# Patient Record
Sex: MALE | Race: ASIAN | NOT HISPANIC OR LATINO
[De-identification: names, ages, dates, MRNs, and addresses within clinical notes are randomized per-mention and may not be internally consistent; named-entity substitution may affect disease eponyms.]

---

## 2017-02-21 ENCOUNTER — TRANSCRIPTION ENCOUNTER (OUTPATIENT)
Age: 42
End: 2017-02-21

## 2017-03-12 ENCOUNTER — TRANSCRIPTION ENCOUNTER (OUTPATIENT)
Age: 42
End: 2017-03-12

## 2018-01-11 ENCOUNTER — TRANSCRIPTION ENCOUNTER (OUTPATIENT)
Age: 43
End: 2018-01-11

## 2018-06-25 ENCOUNTER — TRANSCRIPTION ENCOUNTER (OUTPATIENT)
Age: 43
End: 2018-06-25

## 2018-07-09 ENCOUNTER — EMERGENCY (EMERGENCY)
Facility: HOSPITAL | Age: 43
LOS: 1 days | Discharge: HOME | End: 2018-07-09
Attending: EMERGENCY MEDICINE | Admitting: EMERGENCY MEDICINE
Payer: COMMERCIAL

## 2018-07-09 VITALS
TEMPERATURE: 98 F | RESPIRATION RATE: 16 BRPM | DIASTOLIC BLOOD PRESSURE: 76 MMHG | OXYGEN SATURATION: 100 % | HEIGHT: 72 IN | WEIGHT: 229.94 LBS | HEART RATE: 70 BPM | SYSTOLIC BLOOD PRESSURE: 130 MMHG

## 2018-07-09 PROCEDURE — 99282 EMERGENCY DEPT VISIT SF MDM: CPT

## 2018-07-09 PROCEDURE — 99283 EMERGENCY DEPT VISIT LOW MDM: CPT

## 2018-07-09 RX ORDER — METFORMIN HYDROCHLORIDE 850 MG/1
1 TABLET ORAL
Qty: 0 | Refills: 0 | COMMUNITY

## 2018-07-09 RX ORDER — METHYLPHENIDATE HCL 5 MG
1 TABLET ORAL
Qty: 0 | Refills: 0 | COMMUNITY

## 2018-07-09 NOTE — ED PROVIDER NOTE - MEDICAL DECISION MAKING DETAILS
cut left 3rd digit with razor cutting tennis ball, washed out immediately at home, superficial and linear to left 3rd fingertip, provided wound care and dermabond closure, tolerated well, f/u 2d wound check

## 2018-07-09 NOTE — ED PROVIDER NOTE - OBJECTIVE STATEMENT
44 y/o M pt with hx of DM presents to the ED c/o laceration and bleeding to the left 3rd finger s/p accidently cutting it with a razor blade approximately 1 hour ago. Pt states that he was trying to cut a tennis ball when his hand slipped and cut his left 3rd finger. Tetanus is UTD (last one March 2018). Denies numbness or tingling in the finger, weakness of the finger, foreign body other than the razor blade. No other complaints at this time.

## 2020-02-03 ENCOUNTER — TRANSCRIPTION ENCOUNTER (OUTPATIENT)
Age: 45
End: 2020-02-03

## 2020-08-10 ENCOUNTER — TRANSCRIPTION ENCOUNTER (OUTPATIENT)
Age: 45
End: 2020-08-10

## 2021-03-11 NOTE — ED ADULT NURSE NOTE - CCCP TRG CHIEF CMPLNT
Date of Service: 03/10/2021    The patient was last seen 05/2020 for ongoing treatment of sleep apnea syndrome.  The patient is now working first shift at [employer] and he is pleased with the replacement ResMed S10 CPAP unit provided through [employer].  Most recent data download of 03/08/2021 shows average usage of 7 hours per night, a median delivered pressure of 11.2 and a 95th percentile delivered pressure of 15.4 cm with a set range of 10-20 cm.  There was no significant mask leakage or residual AHI.    We reviewed a variety of issues related to CPAP cleaning machines and sleep hygiene as well as CPAP maintenance and humidification.  The patient will be reevaluated in 1 year, sooner if needed.      Dictated By: Theodore Greenberg MD  Signing Provider: MD BELKYS Massey/maricarmen ([id number])  DD: 03/10/2021 15:03:36 TD: 03/11/2021 08:15:20    Copy Sent To:    lacerations

## 2022-05-02 ENCOUNTER — EMERGENCY (EMERGENCY)
Facility: HOSPITAL | Age: 47
LOS: 1 days | Discharge: HOME | End: 2022-05-02
Attending: EMERGENCY MEDICINE | Admitting: EMERGENCY MEDICINE
Payer: COMMERCIAL

## 2022-05-02 VITALS
TEMPERATURE: 98 F | RESPIRATION RATE: 18 BRPM | HEART RATE: 100 BPM | OXYGEN SATURATION: 98 % | DIASTOLIC BLOOD PRESSURE: 80 MMHG | SYSTOLIC BLOOD PRESSURE: 115 MMHG

## 2022-05-02 VITALS
WEIGHT: 229.94 LBS | SYSTOLIC BLOOD PRESSURE: 113 MMHG | HEART RATE: 100 BPM | OXYGEN SATURATION: 96 % | DIASTOLIC BLOOD PRESSURE: 79 MMHG | TEMPERATURE: 98 F | HEIGHT: 71 IN | RESPIRATION RATE: 18 BRPM

## 2022-05-02 PROCEDURE — 73030 X-RAY EXAM OF SHOULDER: CPT | Mod: 26,LT

## 2022-05-02 PROCEDURE — 73030 X-RAY EXAM OF SHOULDER: CPT

## 2022-05-02 PROCEDURE — 99283 EMERGENCY DEPT VISIT LOW MDM: CPT | Mod: 25

## 2022-05-02 PROCEDURE — 99284 EMERGENCY DEPT VISIT MOD MDM: CPT

## 2022-05-02 NOTE — ED PROVIDER NOTE - PATIENT PORTAL LINK FT
You can access the FollowMyHealth Patient Portal offered by [facility] by registering at the following website: [email or web address]. By joining [employer]’s FollowMyHealth portal, you will also be able to view your health information using other applications (apps) compatible with our system.

## 2022-05-02 NOTE — ED PROVIDER NOTE - NSFOLLOWUPINSTRUCTIONS_ED_ALL_ED_FT
Shoulder Sprain       A shoulder sprain is a partial or complete tear in one of the tough, fiber-like tissues (ligaments) in the shoulder. The ligaments in the shoulder help to hold the shoulder in place.      What are the causes?    This condition may be caused by:  •A fall.      •A hit to the shoulder.      •A twist of the arm.        What increases the risk?    You are more likely to develop this condition if you:  •Play sports.      •Have problems with balance or coordination.        What are the signs or symptoms?    Symptoms of this condition include:  •Pain when moving the shoulder.      •Limited ability to move the shoulder.      •Swelling and tenderness on top of the shoulder.      •Warmth in the shoulder.      •A change in the shape of the shoulder.      •Redness or bruising on the shoulder.        How is this diagnosed?    This condition is diagnosed with:  •A physical exam. During the exam, you may be asked to do simple exercises with your shoulder.      •Imaging tests such as X-rays, MRI, or a CT scan. These tests can show how severe the sprain is.        How is this treated?    This condition may be treated with:  •Rest.      •Pain medicine.      •Ice.      •A sling or brace. This is used to keep the arm still while the shoulder is healing.      •Physical therapy or rehabilitation exercises. These help to improve the range of motion and strength of the shoulder.      •Surgery (rare). Surgery may be needed if the sprain caused a joint to become unstable. Surgery may also be needed to reduce pain.      Some people may develop ongoing shoulder pain or lose some range of motion in the shoulder. However, most people do not develop long-term problems.      Follow these instructions at home:     If you have a sling or brace:     •Wear the sling or brace as told by your health care provider. Remove it only as told by your health care provider.      •Loosen the sling or brace if your fingers tingle, become numb, or turn cold and blue.      •Keep the sling or brace clean.    •If the sling or brace is not waterproof:  •Do not let it get wet.      •Cover it with a watertight covering when you take a bath or shower.        Activity     •Rest your shoulder.      •Move your arm only as much as told by your health care provider, but move your hand and fingers often to prevent stiffness and swelling.      •Return to your normal activities as told by your health care provider. Ask your health care provider what activities are safe for you.      •Ask your health care provider when it is safe for you to drive if you have a sling or brace on your shoulder.      •If you were shown how to do any exercises, do them as told by your health care provider.      General instructions   •If directed, put ice on the affected area.  •Put ice in a plastic bag.      •Place a towel between your skin and the bag.      •Leave the ice on for 20 minutes, 2–3 times a day.        •Take over-the-counter and prescription medicines only as told by your health care provider.      • Do not use any products that contain nicotine or tobacco, such as cigarettes, e-cigarettes, and chewing tobacco. These can delay healing. If you need help quitting, ask your health care provider.      •Keep all follow-up visits as told by your health care provider. This is important.        Contact a health care provider if:    •Your pain gets worse.      •Your pain is not relieved with medicines.      •You have increased redness or swelling.        Get help right away if:    •You have a fever.      •You cannot move your arm or shoulder.      •You develop severe numbness or tingling in your arm, hand, or fingers.      •Your arm, hand, or fingers feel cold and turn blue, white, or gray.        Summary    •A shoulder sprain is a partial or complete tear in one of the tough, fiber-like tissues (ligaments) in the shoulder.      •This condition may be caused by a fall, a hit to the shoulder, or a twist of the arm.      •Treatment usually includes rest, ice, and pain medicine as needed.      •If you have a sling or brace, wear it as told by your health care provider. Remove it only as told by your health care provider.      This information is not intended to replace advice given to you by your health care provider. Make sure you discuss any questions you have with your health care provider.

## 2022-05-02 NOTE — ED PROVIDER NOTE - CARE PROVIDER_API CALL
Jeferson Montero)  Dom Pinzon  [address]  [place] [address]  Phone: [phone]  Fax: [phone]  Follow Up Time:

## 2022-05-02 NOTE — ED PROVIDER NOTE - OBJECTIVE STATEMENT
48yo male who presents with left shoulder pain tonight. pt was pulling the fire hose and felt a pull in his shoulder, no clicking or popping, pt c/o left shoulder pain, diffuse non radiating no tingling or weakness, no other complaints, pt did not take anything for the pain and is right hand dominant

## 2022-05-02 NOTE — ED ADULT NURSE NOTE - NSFALLRSKASSESASSIST_ED_ALL_ED
-- Message is from the [facility]--    Reason for Call: PATIENT IS RETURNING OFFICE  CALL    Caller Information       Type Contact Phone    06/19/2019 12:29 PM Phone (Outgoing) Olena Cano (Self) [phone] (M)    Left Message           Alternative phone number:     Turnaround time given to caller:   \"This message will be sent to [state Provider's name]. The clinical team will fulfill your request as soon as they review your message.\"     no

## 2022-10-18 ENCOUNTER — NON-APPOINTMENT (OUTPATIENT)
Age: 47
End: 2022-10-18

## 2023-06-21 NOTE — ED PROCEDURE NOTE - PROCEDURE NAME, MLM
Laceration Repair PRE-OP DIAGNOSIS:  Symptomatic cholelithiasis 21-Jun-2023 12:52:01  Jorge A Robbins   PRE-OP DIAGNOSIS:  Choledocholithiasis 23-Jun-2023 09:44:37  Richard Hadley  Cholelithiasis with cholangitis 23-Jun-2023 09:44:46  Richard Hadley

## 2023-08-29 ENCOUNTER — NON-APPOINTMENT (OUTPATIENT)
Age: 48
End: 2023-08-29

## 2023-08-29 PROBLEM — E11.9 TYPE 2 DIABETES MELLITUS WITHOUT COMPLICATIONS: Chronic | Status: ACTIVE | Noted: 2018-07-09

## 2023-09-01 ENCOUNTER — NON-APPOINTMENT (OUTPATIENT)
Age: 48
End: 2023-09-01

## 2023-09-01 ENCOUNTER — APPOINTMENT (OUTPATIENT)
Dept: INTERNAL MEDICINE | Facility: CLINIC | Age: 48
End: 2023-09-01
Payer: COMMERCIAL

## 2023-09-01 VITALS
TEMPERATURE: 98.4 F | OXYGEN SATURATION: 97 % | WEIGHT: 220 LBS | SYSTOLIC BLOOD PRESSURE: 108 MMHG | BODY MASS INDEX: 30.8 KG/M2 | HEART RATE: 82 BPM | HEIGHT: 71 IN | DIASTOLIC BLOOD PRESSURE: 60 MMHG | RESPIRATION RATE: 15 BRPM

## 2023-09-01 DIAGNOSIS — E55.9 VITAMIN D DEFICIENCY, UNSPECIFIED: ICD-10-CM

## 2023-09-01 DIAGNOSIS — Z78.9 OTHER SPECIFIED HEALTH STATUS: ICD-10-CM

## 2023-09-01 DIAGNOSIS — Z87.891 PERSONAL HISTORY OF NICOTINE DEPENDENCE: ICD-10-CM

## 2023-09-01 DIAGNOSIS — I10 ESSENTIAL (PRIMARY) HYPERTENSION: ICD-10-CM

## 2023-09-01 DIAGNOSIS — Z83.3 FAMILY HISTORY OF DIABETES MELLITUS: ICD-10-CM

## 2023-09-01 DIAGNOSIS — E11.9 TYPE 2 DIABETES MELLITUS W/OUT COMPLICATIONS: ICD-10-CM

## 2023-09-01 DIAGNOSIS — E78.5 HYPERLIPIDEMIA, UNSPECIFIED: ICD-10-CM

## 2023-09-01 DIAGNOSIS — F41.9 ANXIETY DISORDER, UNSPECIFIED: ICD-10-CM

## 2023-09-01 DIAGNOSIS — F90.9 ATTENTION-DEFICIT HYPERACTIVITY DISORDER, UNSPECIFIED TYPE: ICD-10-CM

## 2023-09-01 DIAGNOSIS — Z00.00 ENCOUNTER FOR GENERAL ADULT MEDICAL EXAMINATION W/OUT ABNORMAL FINDINGS: ICD-10-CM

## 2023-09-01 PROCEDURE — 99386 PREV VISIT NEW AGE 40-64: CPT | Mod: 25

## 2023-09-01 PROCEDURE — 93000 ELECTROCARDIOGRAM COMPLETE: CPT | Mod: 59

## 2023-09-01 RX ORDER — ALPRAZOLAM 0.25 MG/1
0.25 TABLET ORAL TWICE DAILY
Refills: 0 | Status: ACTIVE | COMMUNITY

## 2023-09-01 RX ORDER — DULAGLUTIDE 4.5 MG/.5ML
4.5 INJECTION, SOLUTION SUBCUTANEOUS
Refills: 0 | Status: ACTIVE | COMMUNITY

## 2023-09-01 RX ORDER — CHOLECALCIFEROL (VITAMIN D3) 1250 MCG
1.25 MG CAPSULE ORAL
Refills: 0 | Status: ACTIVE | COMMUNITY

## 2023-09-01 RX ORDER — METFORMIN HYDROCHLORIDE 1000 MG/1
1000 TABLET, COATED ORAL
Refills: 0 | Status: ACTIVE | COMMUNITY

## 2023-09-01 RX ORDER — ESCITALOPRAM OXALATE 20 MG/1
20 TABLET ORAL
Refills: 0 | Status: ACTIVE | COMMUNITY

## 2023-09-01 RX ORDER — DAPAGLIFLOZIN 5 MG/1
5 TABLET, FILM COATED ORAL
Qty: 30 | Refills: 2 | Status: ACTIVE | COMMUNITY

## 2023-09-01 RX ORDER — LISINOPRIL 5 MG/1
5 TABLET ORAL
Qty: 30 | Refills: 0 | Status: ACTIVE | COMMUNITY

## 2023-09-01 RX ORDER — ATORVASTATIN CALCIUM 10 MG/1
10 TABLET, FILM COATED ORAL DAILY
Qty: 30 | Refills: 0 | Status: ACTIVE | COMMUNITY

## 2023-09-01 RX ORDER — METHYLPHENIDATE HYDROCHLORIDE 10 MG/1
10 TABLET ORAL TWICE DAILY
Refills: 0 | Status: ACTIVE | COMMUNITY

## 2023-09-01 NOTE — HISTORY OF PRESENT ILLNESS
[de-identified] : 48 year old male with PMH HTN, HLD, type 2 DM, vitamin D deficiency, ADHD, anxiety who presents today to establish care/for CPE.  He is doing well and has no acute complaints.

## 2023-09-01 NOTE — HEALTH RISK ASSESSMENT
[Good] : ~his/her~  mood as  good [Yes] : Yes [2 - 4 times a month (2 pts)] : 2-4 times a month (2 points) [1 or 2 (0 pts)] : 1 or 2 (0 points) [Never (0 pts)] : Never (0 points) [No] : In the past 12 months have you used drugs other than those required for medical reasons? No [Little interest or pleasure doing things] : 1) Little interest or pleasure doing things [Feeling down, depressed, or hopeless] : 2) Feeling down, depressed, or hopeless [0] : 2) Feeling down, depressed, or hopeless: Not at all (0) [PHQ-2 Negative - No further assessment needed] : PHQ-2 Negative - No further assessment needed [With Family] : lives with family [Employed] : employed [] :  [# Of Children ___] : has [unfilled] children [Feels Safe at Home] : Feels safe at home [Fully functional (bathing, dressing, toileting, transferring, walking, feeding)] : Fully functional (bathing, dressing, toileting, transferring, walking, feeding) [Fully functional (using the telephone, shopping, preparing meals, housekeeping, doing laundry, using] : Fully functional and needs no help or supervision to perform IADLs (using the telephone, shopping, preparing meals, housekeeping, doing laundry, using transportation, managing medications and managing finances) [Former] : Former [0-4] : 0-4 [Audit-CScore] : 2 [[id number]] : 0 [FreeTextEntry2] :

## 2023-09-01 NOTE — PLAN
[FreeTextEntry1] : HCM:  - Discussed colon cancer screening, pt is agreeable to a colonoscopy. He has been referred to GI Dr. Kaplan or colorectal Dr. Vazquez.  - Up to date with routine vaccines  - EKG: NSR  - Check routine labs today   Type 2 DM - Follows with endo Dr. Grady  - Check A1c, urine microalbumin  - Discussed foot exam and eye exam  - Continue metformin 1000 mg BID, Farxiga 5 mg daily, and Trulicty 4.5 mg once a week   HTN:  - BP well controlled  - Continue lisinopril 5 mg daily   HLD:  - Check lipid profile, CMP  - Continue atorvastatin 10 mg daily   ADHD:  - Follows with psych  - Continue methylphenidate 10 mg BID   Anxiety:  - Follows with psych  - Continue Lexapro 20 mg daily  - Continue Xanax 0.25 mg BID

## 2023-09-05 DIAGNOSIS — E78.1 PURE HYPERGLYCERIDEMIA: ICD-10-CM

## 2023-09-05 LAB
25(OH)D3 SERPL-MCNC: 54.5 NG/ML
ALBUMIN SERPL ELPH-MCNC: 4.9 G/DL
ALP BLD-CCNC: 74 U/L
ALT SERPL-CCNC: 32 U/L
ANION GAP SERPL CALC-SCNC: 14 MMOL/L
APPEARANCE: CLEAR
AST SERPL-CCNC: 23 U/L
BACTERIA: NEGATIVE /HPF
BASOPHILS # BLD AUTO: 0.04 K/UL
BASOPHILS NFR BLD AUTO: 0.6 %
BILIRUB SERPL-MCNC: 0.7 MG/DL
BILIRUBIN URINE: NEGATIVE
BLOOD URINE: NEGATIVE
BUN SERPL-MCNC: 23 MG/DL
CALCIUM SERPL-MCNC: 10 MG/DL
CAST: 0 /LPF
CHLORIDE SERPL-SCNC: 101 MMOL/L
CHOLEST SERPL-MCNC: 171 MG/DL
CO2 SERPL-SCNC: 24 MMOL/L
COLOR: YELLOW
CREAT SERPL-MCNC: 1.17 MG/DL
CREAT SPEC-SCNC: 98 MG/DL
EGFR: 76 ML/MIN/1.73M2
EOSINOPHIL # BLD AUTO: 0.13 K/UL
EOSINOPHIL NFR BLD AUTO: 2 %
EPITHELIAL CELLS: 0 /HPF
ESTIMATED AVERAGE GLUCOSE: 157 MG/DL
FOLATE SERPL-MCNC: 9.6 NG/ML
GLUCOSE QUALITATIVE U: >=1000 MG/DL
GLUCOSE SERPL-MCNC: 159 MG/DL
HBA1C MFR BLD HPLC: 7.1 %
HCT VFR BLD CALC: 45.6 %
HDLC SERPL-MCNC: 31 MG/DL
HGB BLD-MCNC: 15.2 G/DL
IMM GRANULOCYTES NFR BLD AUTO: 0.3 %
KETONES URINE: NEGATIVE MG/DL
LDLC SERPL CALC-MCNC: 45 MG/DL
LEUKOCYTE ESTERASE URINE: NEGATIVE
LYMPHOCYTES # BLD AUTO: 1.95 K/UL
LYMPHOCYTES NFR BLD AUTO: 29.4 %
MAN DIFF?: NORMAL
MCHC RBC-ENTMCNC: 29.7 PG
MCHC RBC-ENTMCNC: 33.3 GM/DL
MCV RBC AUTO: 89.2 FL
MICROALBUMIN 24H UR DL<=1MG/L-MCNC: 1.4 MG/DL
MICROALBUMIN/CREAT 24H UR-RTO: 15 MG/G
MICROSCOPIC-UA: NORMAL
MONOCYTES # BLD AUTO: 0.52 K/UL
MONOCYTES NFR BLD AUTO: 7.8 %
NEUTROPHILS # BLD AUTO: 3.97 K/UL
NEUTROPHILS NFR BLD AUTO: 59.9 %
NITRITE URINE: NEGATIVE
NONHDLC SERPL-MCNC: 140 MG/DL
PH URINE: 6
PLATELET # BLD AUTO: 271 K/UL
POTASSIUM SERPL-SCNC: 4.4 MMOL/L
PROT SERPL-MCNC: 7.1 G/DL
PROTEIN URINE: NEGATIVE MG/DL
PSA SERPL-MCNC: 0.71 NG/ML
RBC # BLD: 5.11 M/UL
RBC # FLD: 11.9 %
RED BLOOD CELLS URINE: 1 /HPF
SODIUM SERPL-SCNC: 138 MMOL/L
SPECIFIC GRAVITY URINE: >1.03
TRIGL SERPL-MCNC: 667 MG/DL
TSH SERPL-ACNC: 0.97 UIU/ML
UROBILINOGEN URINE: 0.2 MG/DL
VIT B12 SERPL-MCNC: 528 PG/ML
WBC # FLD AUTO: 6.63 K/UL
WHITE BLOOD CELLS URINE: 0 /HPF

## 2023-09-05 RX ORDER — FENOFIBRATE 48 MG/1
48 TABLET ORAL DAILY
Qty: 90 | Refills: 2 | Status: ACTIVE | COMMUNITY
Start: 2023-09-05 | End: 1900-01-01

## 2024-05-22 PROBLEM — Z78.9 OTHER SPECIFIED HEALTH STATUS: Chronic | Status: ACTIVE | Noted: 2022-05-02

## 2024-05-31 ENCOUNTER — RX RENEWAL (OUTPATIENT)
Age: 49
End: 2024-05-31

## 2024-06-11 ENCOUNTER — NON-APPOINTMENT (OUTPATIENT)
Age: 49
End: 2024-06-11

## 2024-08-20 ENCOUNTER — APPOINTMENT (OUTPATIENT)
Dept: GASTROENTEROLOGY | Facility: CLINIC | Age: 49
End: 2024-08-20
Payer: COMMERCIAL

## 2024-08-20 VITALS
SYSTOLIC BLOOD PRESSURE: 128 MMHG | OXYGEN SATURATION: 95 % | DIASTOLIC BLOOD PRESSURE: 85 MMHG | HEART RATE: 70 BPM | BODY MASS INDEX: 28.42 KG/M2 | HEIGHT: 71 IN | WEIGHT: 203 LBS

## 2024-08-20 DIAGNOSIS — F90.9 ATTENTION-DEFICIT HYPERACTIVITY DISORDER, UNSPECIFIED TYPE: ICD-10-CM

## 2024-08-20 DIAGNOSIS — E78.1 PURE HYPERGLYCERIDEMIA: ICD-10-CM

## 2024-08-20 DIAGNOSIS — F41.9 ANXIETY DISORDER, UNSPECIFIED: ICD-10-CM

## 2024-08-20 DIAGNOSIS — E78.5 HYPERLIPIDEMIA, UNSPECIFIED: ICD-10-CM

## 2024-08-20 DIAGNOSIS — Z12.11 ENCOUNTER FOR SCREENING FOR MALIGNANT NEOPLASM OF COLON: ICD-10-CM

## 2024-08-20 DIAGNOSIS — E11.9 TYPE 2 DIABETES MELLITUS W/OUT COMPLICATIONS: ICD-10-CM

## 2024-08-20 DIAGNOSIS — E55.9 VITAMIN D DEFICIENCY, UNSPECIFIED: ICD-10-CM

## 2024-08-20 DIAGNOSIS — Z12.12 ENCOUNTER FOR SCREENING FOR MALIGNANT NEOPLASM OF COLON: ICD-10-CM

## 2024-08-20 DIAGNOSIS — I10 ESSENTIAL (PRIMARY) HYPERTENSION: ICD-10-CM

## 2024-08-20 PROCEDURE — 99203 OFFICE O/P NEW LOW 30 MIN: CPT

## 2024-08-20 RX ORDER — TIRZEPATIDE 10 MG/.5ML
10 INJECTION, SOLUTION SUBCUTANEOUS
Refills: 0 | Status: ACTIVE | COMMUNITY

## 2024-08-20 RX ORDER — POLYETHYLENE GLYCOL 3350 AND ELECTROLYTES WITH LEMON FLAVOR 236; 22.74; 6.74; 5.86; 2.97 G/4L; G/4L; G/4L; G/4L; G/4L
236 POWDER, FOR SOLUTION ORAL
Qty: 1 | Refills: 0 | Status: ACTIVE | COMMUNITY
Start: 2024-08-20 | End: 1900-01-01

## 2024-08-20 NOTE — PHYSICAL EXAM
[Cervical Lymph Nodes Enlarged Posterior Bilaterally] : no posterior cervical lymphadenopathy [Supraclavicular Lymph Nodes Enlarged Bilaterally] : no supraclavicular lymphadenopathy [No Clubbing, Cyanosis] : no clubbing or cyanosis of the fingernails [Normal] : oriented to person, place, and time

## 2024-08-20 NOTE — HISTORY OF PRESENT ILLNESS
[FreeTextEntry1] : 50 y/o father of two, HLD, HTN, DM, Anxiety/ADHD who presents for a screening colonoscopy.  Never had a colonoscopy.   Patient denies family history of colon polyp and gastrointestinal cancers.  He lost 30 lbs while on Mounjoro.   Patient denies having abdominal pain, constipation, diarrhea, loss of appetite, melena and hematochezia.  All other review of systems are negative.  Denies cardiac symptoms.

## 2024-08-20 NOTE — ASSESSMENT
[FreeTextEntry1] : IMPRESSION:  #  Due for a screening test for colorectal cancer -  Never had colonoscopy -  Patient denies family history of colon polyp and gastrointestinal cancers.   #  Comorbidities:  HLD, HTN, DM, Anxiety/ADHD    PLAN:  We discussed colorectal cancer in the [place].   Colorectal cancer is the 3rd most common cause of cancer for men and woman, 2nd most common cause of cancer related deaths in the [place].  By 2030, colorectal cancer will be the leading cause of cancer related death in age 20 to 49.   We reviewed risk factors for colon cancer which include age, personal history of colon polyps, having a family Hx of colon cancer, cancer syndromes, personal Hx of inflammatory disease, having comorbidities such as obesity, having nicotine addiction, alcohol addiction,etc.   Screening for colorectal cancer begins at age 45 for average risk individuals (and earlier for high risk groups).   This patient is AVERAGE RISK for colorectal cancer (patient without a history of advanced adenomas or 3 or more adenomas, colorectal cancer, family history of colorectal cancer, inflammatory bowel disease, and known genetic predisposition to colorectal cancer).     We reviewed the screening tests for colorectal cancer detection and prevention.  We discussed screening tests such as stool based strategies, direct visualization techniques such as Virtual (CT) colonography, flexible sigmoidoscopy, and colonoscopy.    Patient was made aware that despite these screening test, a lesion can still be missed.    Certain precancerous lesions such as sessile serrated lesions are more difficult to detect on screening exams than the conventional, precancerous lesion, adenomas.  Colonoscopy offers the best chance at detecting sessile serrated lesions.  Stool DNA tests detect only a fraction of large sessile serrated lesions, FIT detect sessile serrated lesions poorly because sessile serrated lesions rarely bleed even if large.  CT colonography does not detect sessile serrated lesions due to its flat morphology.  Sigmoidoscopy does not examine the proximal colon where most of sessile serrated lesions reside.  I have advised the patient to arrange for a colonoscopy to rule out colon polyps, colorectal cancer etc. under monitored anesthesia care.  Risks such as perforation requiring surgery, colostomy, bleeding requiring blood transfusion, infection/sepsis, diverticulitis, colitis, missed colon cancer (2% to 6%), internal organ injury such as splenic hemorrhage (1/6000, risk thin, female gender) etc, adverse reaction to medication etc. and risks of anesthesia including cardiopulmonary compromise requiring ICU care were discussed with patient.  Alternatives were discussed.  Patient verbalized understanding and agrees to proceed with a colonoscopy under anesthesia.  Dapagliflozin (Farxiga) - Hold 3 days prior to procedure. Mounjaro - Hold 7 days prior to procedure.    Risk factors for inadequate prep: -  Previous inadequate prep -  Previous colon resection -  Constipation drugs, chronic constipation -  DM, Obesity, stroke, spinal cord injury, Parkinson's   Take Stimulant Dulcolax two to three nights leading to colonoscopy (2 pills daily for two days, 4 pills one day prior to colonoscopy) - then Golyltey day prior to colonoscopy.